# Patient Record
Sex: FEMALE | Race: OTHER | URBAN - METROPOLITAN AREA
[De-identification: names, ages, dates, MRNs, and addresses within clinical notes are randomized per-mention and may not be internally consistent; named-entity substitution may affect disease eponyms.]

---

## 2024-04-05 ENCOUNTER — OFFICE VISIT (OUTPATIENT)
Dept: POSTPARTUM | Facility: CLINIC | Age: 39
End: 2024-04-05

## 2024-04-05 DIAGNOSIS — R52 PAIN AGGRAVATED BY BREAST FEEDING: ICD-10-CM

## 2024-04-05 DIAGNOSIS — O92.79 PAIN AGGRAVATED BY BREAST FEEDING: ICD-10-CM

## 2024-04-05 PROCEDURE — 99214 OFFICE O/P EST MOD 30 MIN: CPT | Performed by: PEDIATRICS

## 2024-04-05 NOTE — PROGRESS NOTES
"  INITIAL BREAST FEEDING EVALUATION    Informant/Relationship: Erum    Discussion of General Lactation Issues: Feedings have been painful since the beginning.  Erum's nipples were damaged early. Erum stopped breastfeeding for a bit due to her pain but started again yesterday.  She is feeding with a nipple shield.   The shields were provided by the hospital that Erum delivered at (in [place]).  She did not receive support from a lactation professional while in the hospital.  Erum has been experiencing \"pins and needles\" pain in her arms/hands and occasionally her legs while she feeds.    Infant is 6 days old today.        History:  Fertility Problem:no  Breast changes:yes - breasts were robison and tender, areolas were larger and darker.  Developed pain in her nipples at 16 weeks that was relieved with warm compresses.  This resolved spontaneously over time  : yes - after a prolonged labor.  Labored at home without meds for 3 days prior to going the hospital.  Labor was augmented with pitocin and Erum had an epidural  Full term:yes - 39 6/7 weeks   labor:no  First nursing/attempt < 1 hour after birth:yes - baby latched briefly in the delivery room during STS  Skin to skin following delivery:yes - in the delivery room after baby was assessed due to nuchal cord  Breast changes after delivery:yes - breasts are robison.  Milk was in by day 3  Rooming in (infant in room with mother with exception of procedures, eg. Circumcision: yes  Blood sugar issues:no  NICU stay:no  Jaundice:mild  Phototherapy:no  Supplement given: (list supplement and method used as well as reason(s):no    No past medical history on file.    No current outpatient medications on file.    Not on File    Social History     Substance and Sexual Activity   Drug Use Not on file       Social History Never a smoker    Interval Breastfeeding History:  Frequency of breast feeding: Every 2-3 hours.  Sometimes she needs " to be woken up for feedings.  Does mother feel breastfeeding is effective: If no, explain: Erum is not sure  Does infant appear satisfied after nursing:Yes, but only since she started using a nipple shield  Stooling pattern normal: No.  She has not passed a stool in more than 24 hours  Urinating frequently:Yes  Using shield or shells: Yes     Alternative/Artificial Feedings:   Bottle: Yes, but no in the last 24 hours.  Had been exclusively bottle feeding for a day or so.  Using an Sindi bottle. Not using paced bottle feeding technique.  Cup: No  Syringe/Finger: No           Formula Type: Enfamil NeuroPro                     Amount: none in the last 24 hours. Prior to that she was fed 1 ounce 6 times over 24 hours            Breast Milk:                      Amount: 30 ml when available            Frequency Q 2-3 Hr between feedings  Elimination Problems: Yes, not stooling      Equipment:  Nipple Shield             Type: Medela contact             Size: 24mm and 20mm             Frequency of Use: currently for every feeding.  Pump            Type: Has a wearable pump and a manual pump.  Does not know the brand of either            Frequency of Use: since resuming breastfeeding, not much at all.  Did not pump or nurse at all for about 24 hours when breastfeeding became too painful. For a short time was pumping after every feeding.  Shells            Type: none            Frequency of use: n/a    Equipment Problems: yes pumping is painful    Mom:  Breast: Large pendulous breasts.  Closely spaced.  Some palpable fullness  Nipple Assessment in General: small everted nipples bilaterally  Mother's Awareness of Feeding Cues                 Recognizes: Yes                  Verbalizes: Yes  Support System: FOB, extended family  History of Breastfeeding: none  Changes/Stressors/Violence: Erum is concerned that breastfeeding is painful, that Mandi is not getting adequate milk when nursing and that her milk production  is not sufficient.  Concerns/Goals: Erum desires to breastfeed during her maternity leave and then provide all the milk that Mandi needs for at least a year.    Problems with Mom: attachment associated pain    Physical Exam  Constitutional:       Appearance: Normal appearance.   Pulmonary:      Effort: Pulmonary effort is normal.   Musculoskeletal:         General: Normal range of motion.      Cervical back: Normal range of motion and neck supple.   Neurological:      Mental Status: She is alert and oriented to person, place, and time.   Skin:     General: Skin is warm and dry.   Psychiatric:         Mood and Affect: Mood normal.         Behavior: Behavior normal.         Thought Content: Thought content normal.         Judgment: Judgment normal.         Infant:  Behaviors: Alert  Color: normal  Birth weight: 2925 grams  Current weight: 2760 grams     Problems with infant: slow weight gain, shallow latch      General Appearance:  Alert, active, no distress                            Head:  Normocephalic, AFOF, sutures opposed                            Eyes:   Conjunctiva clear, no drainage                            Ears:   Normally placed, no anomolies                           Nose:   No drainage or erythema                          Mouth:  No lesions.  Wide gape.  Tongue extends to the lower lip, the tip of the tongue lateralizes slightly but the tongue remains flat with only the very edges of the tongue curling up with crying. Good cupping and peristalsis felt as she sucked but she recruited her lips and gums to keep my finger in her mouth.  The lingual frenulum is short, attached midway between the tip and the base of the tongue with a webbed attachment at the base of the lower alveolar ridge.                   Neck:  Supple, symmetrical, trachea midline                Respiratory:  No grunting, flaring, retractions, breath sounds clear and equal           Cardiovascular:  Regular rate and rhythm. No  murmur. Adequate perfusion/capillary refill. Femoral pulse present                  Abdomen:    Soft, non-tender, no masses, bowel sounds present, no HSM            Genitourinary:  Normal female genitalia, anus patent                         Spine:   No abnormalities noted       Musculoskeletal:   Full range of motion         Skin/Hair/Nails:   Skin warm, dry, and intact, no rashes or abnormal dyspigmentation or lesions               Neurologic:   No abnormal movement, tone appropriate for gestational age    [place] Latch:  Efficiency:               Lips Flanged: Yes              Depth of latch: deep initially but slipped back into a shallow latch as she fed.              Audible Swallow: Yes, sustained SSB              Visible Milk: Yes              Wide Open/ Asymmetrical: Yes, to start              Suck Swallow Cycle: Breathing: unlabored, Coordinated: yes  Nipple Assessment after latch: Normal: elongated/eraser, no discoloration and no damage noted.  Latch Problems: Erum needed some help with positioning.  After education, and with support from me, she was able to guide Mandi to a deep asymmetrical latch without a nipple shield.  She experienced more pain when feeding on the left breast.  She rated the pain 4/10 during most of the feeding.  The pain intensified as Mandi slipped into a shallow latch.  Latch on the right breast was initially completely comfortable but pain resumed as again Mandi slipped into a shallow latch.  Mandi was able to feed on both breasts with a good amount of active SSB observed and she was completely content after feeding.    Position:  Infant's Ergonomics/Body               Body Alignment: Yes               Head Supported: Yes               Close to Mom's body/ Lifted/ Supported: Yes, after education.               Mom's Ergonomics/Body: Yes, after education                           Supported: Yes                           Sitting Back: Yes, after education                            Brings Baby to her breast: Yes, after education  Positioning Problems: Initially, Erum positioned Mandi in cross cradle hold.  She leaned forward and twisted her torso to bring her nipple to Mandi's mouth.  There was a visible space between their bodies.  She shaped her breast opposite the direction of Mandi's mouth and positioned her nipple at Mandi's lower lip.      Handouts:   Paced bottle feeding, Hands on pumping, Hand expression, and Latch Check List    Education:  Reviewed Latch: Demonstrated how to gently compress the breast and align the baby so that her nose is just above the nipple with her lower lip and chin touching the breast to encourage the deepest, widest, off-center latch.   Reviewed Positioning for Dyad: Demonstrated how to position mom comfortably and supported with her baby belly to belly prior to bringing her baby to her breast  Reviewed Frequency/Supply & Demand: Discussed how milk production is established and maintained  Reviewed Infant:Cues and varied States of Awareness  Reviewed Infant Elimination: discussed expected stool and urine output at this age  Reviewed Alternative/Artificial Feedings: Discussed and demonstrated paced bottle feeding.  Reviewed Mom/Breast care: Discussed appropriate handling of the breasts to avoid inflammation, pain and injury  Reviewed Equipment: discussed how to use the cycles of the pump to imitate how baby nurses.  Discussed how to determine what size flange to use.      Plan:    Reassurance and support given.  I acknowledged Erum's pain and her commitment to breastfeeding.  I encouraged her to continue to offer the breast as often as she feels comfortable. We worked on positioning to improve her comfort and confidence.  I encouraged her to feed without the nipple shield.  I reviewed the risks involved with nipple shield use and the fact that the shield she is using is too large for her.  I recommended she pump if a feeding at the breast  is missed, if Mandi does not feed effectively at the breast and if milk is needed for supplementation.  She was given information about effective pumping technique and I recommended a smaller flange to improve fit.  I encouraged her to handle her breasts gently to prevent injury, pain and inflammation.  A follow up appointment was scheduled to monitor progress.  Will consider a referral to breastfeeding medicine if her pain does not completely resolve.  Potential sources of her pain include vasospasm or neuropathic that also occurred during her pregnancy or ongoing nipple trauma related to latch and/or suckling issues.  I encouraged Erum to call with any questions or concerns.    I have spent 90 minutes with Patient and family today in which greater than 50% of this time was spent in counseling/coordination of care regarding Patient and family education.

## 2024-04-05 NOTE — PATIENT INSTRUCTIONS
"Continue to offer the breast as often as you feel comfortable.  Gently compress the breast and align the baby so that her nose is just above the nipple with her lower lip and chin touching the breast to encourage the deepest, widest, off-center latch.   Pump if a feeding at the breast is missed, if Mandi does not latch or feed effectively or if milk is needed for supplementation.  When pumping, cycle your pump through stimulation and expression mode several times in a session to stimulate several let downs until you have expressed enough milk to feed the baby and to achieve breast comfort.  There is no need to \"empty\" the breast completely. Use gentle hands on pumping and hand expression   Maintain your pump as recommended. Use flange that fits comfortably and allows the breast to empty effectively.    Follow up as scheduled.  Please call with any questions or concerns.  "

## 2024-04-06 NOTE — PROGRESS NOTES
I have reviewed the notes, assessments, and/or procedures performed by Staci Rogers RN, IBCLC, I concur with her/his documentation of Eurm Albrecht MD 04/06/24

## 2024-04-12 ENCOUNTER — OFFICE VISIT (OUTPATIENT)
Dept: POSTPARTUM | Facility: CLINIC | Age: 39
End: 2024-04-12

## 2024-04-12 DIAGNOSIS — O92.79 PAIN AGGRAVATED BY BREAST FEEDING: ICD-10-CM

## 2024-04-12 DIAGNOSIS — R52 PAIN AGGRAVATED BY BREAST FEEDING: ICD-10-CM

## 2024-04-12 NOTE — PROGRESS NOTES
BREAST FEEDING FOLLOW UP VISIT    Informant/Relationship: Erum    Discussion of General Lactation Issues: Attachment is still painful but brief with complete resolution.  Mandi still slips back into a shallow latch as she feeds.  The pins and needles that Erum was experiencing during feedings has resolved.    Infant is 13 days old today.    Interval Breastfeeding History:  Frequency of breast feeding: Every 1-2.5 hours. Feeds hourly about 6 times a day and the rest of the time she feeds about every 2 hours.  She is only offering one breast per feeding typically  Does mother feel breastfeeding is effective: Yes, but attachment is still painful  Does infant appear satisfied after nursing:Yes, but sometimes only for an hour   Stooling pattern normal: Yes  Urinating frequently:Yes  Using shield or shells: Not currently     Alternative/Artificial Feedings:   Bottle: Yes, but only occasionally.  Using an Sindi bottle. Not using paced bottle feeding technique.  Cup: No  Syringe/Finger: No           Formula Type: Enfamil NeuroPro                     Amount: none recently            Breast Milk:                      Amount: 30 ml             Frequency Q 1-2.5Hr between feedings  Elimination Problems:No        Equipment:  Nipple Shield             Type: Medela contact             Size: 24mm and 20mm             Frequency of Use: none  Pump            Type: Has a wearable pump and a manual pump.  Does not know the brand of either            Frequency of Use: occasionally  Shells            Type: none            Frequency of use: n/a     Equipment Problems: No     Mom:  Breast: Large pendulous breasts.  Closely spaced.   Nipple Assessment in General: small everted nipples bilaterally  Mother's Awareness of Feeding Cues                 Recognizes: Yes                  Verbalizes: Yes  Support System: FOB, extended family  History of Breastfeeding: none  Changes/Stressors/Violence: Erum is concerned that  breastfeeding is painful although not as painful as it was in the beginning.    Concerns/Goals: Erum desires to breastfeed during her maternity leave and then provide all the milk that Mandi needs for at least a year.     Problems with Mom: attachment associated pain    Physical Exam  Constitutional:       Appearance: Normal appearance.   HENT:      Head: Normocephalic and atraumatic.   Pulmonary:      Effort: Pulmonary effort is normal.   Musculoskeletal:         General: Normal range of motion.      Cervical back: Normal range of motion and neck supple.   Neurological:      Mental Status: She is alert and oriented to person, place, and time.   Skin:     General: Skin is warm and dry.   Psychiatric:         Mood and Affect: Mood normal.         Behavior: Behavior normal.         Thought Content: Thought content normal.         Judgment: Judgment normal.         Infant:  Behaviors: Alert  Color: Pink  Birth weight: 2925 grams  Current weight: 2990 grams    Problems with infant: shallow attachment    General Appearance:  Alert, active, no distress                            Head:  Normocephalic, AFOF, sutures opposed                            Eyes:   Conjunctiva clear, no drainage                            Ears:   Normally placed, no anomolies                           Nose:   No drainage or erythema                          Mouth:  No lesions.  Wide gape.  Tongue extends to the lower lip, the tip of the tongue lateralizes slightly but the tongue remains flat with only the very edges of the tongue curling up with crying. Good cupping and peristalsis felt as she sucked but she recruited her lips and gums to keep my finger in her mouth.  The lingual frenulum is short, attached midway between the tip and the base of the tongue with a webbed attachment at the base of the lower alveolar ridge.                            Neck:  Supple, symmetrical, trachea midline                Respiratory:  No grunting, flaring,  "retractions, breath sounds clear and equal           Cardiovascular:  Regular rate and rhythm. No murmur. Adequate perfusion/capillary refill. Femoral pulse present                  Abdomen:    Soft, non-tender, no masses, bowel sounds present, no HSM            Genitourinary:  Normal female genitalia, anus patent                         Spine:   No abnormalities noted       Musculoskeletal:   Full range of motion         Skin/Hair/Nails:   Skin warm, dry, and intact, no rashes or abnormal dyspigmentation or lesions               Neurologic:   No abnormal movement, tone appropriate for gestational age     Latch:  Efficiency:               Lips Flanged: Yes              Depth of latch: good, not optimal. Some dimpling of the cheeks as she sucked              Audible Swallow: Yes, sustained SSB              Visible Milk: Yes              Wide Open/ Asymmetrical: not optimally wide              Suck Swallow Cycle: Breathing: unlabored, Coordinated: yes  Nipple Assessment after latch: Normal: elongated/eraser, no discoloration and no damage noted.  Latch Problems: Erum had latch on pain and then \"pinching\" pain near the dependent corner of Mandi's mouth that she rated a 4/10 for a few minutes and then that pain resolved as well.  Mandi fed effectively and several sustained suckling bursts were observed    Position:  Infant's Ergonomics/Body               Body Alignment: Yes               Head Supported: Yes               Close to Mom's body/ Lifted/ Supported: Yes               Mom's Ergonomics/Body: Yes                           Supported: Yes                           Sitting Back: Yes                           Brings Baby to her breast: Yes  Positioning Problems: None.  Erum positioned Mandi effectively in cross cradle hold with a pillow for support.      Handouts:   None    Education:  Reviewed Latch: discussed that Mandi continues to use her gums to maintain \"latch\" and this explains " Erum's pain        Plan:    Reassurance and support given.  I acknowledged Erum's pain and her commitment to breastfeeding.  I encouraged her to continue to feed Mandi on demand and to pump if a feeding at the breast is missed.  I recommended that she schedule an appointment with breastfeeding medicine if her attachment associated pain does not resolve as Mandi receives treatment from PT and ST  I encouraged her to call with any questions or concerns.    I have spent 60 minutes with Patient and family today in which greater than 50% of this time was spent in counseling/coordination of care regarding Instructions for management, Patient and family education, and Counseling / Coordination of care.

## 2024-04-12 NOTE — PATIENT INSTRUCTIONS
Continue to feed Mandi on demand.  Pump if a feeding at the breast is missed and as needed to obtain milk for bottle feeding.  Please call for a follow up appointment with our physician if your pain does not completely resolve as Mandi receives treatment from PT and ST.  You can also follow up with me as desired for other feeding issues.  Please call with any questions or concerns.

## 2024-04-13 NOTE — PROGRESS NOTES
I have reviewed the notes, assessments, and/or procedures performed by Staci Rogers RN, IBCLC, I concur with her/his documentation of Erum Albrecht MD 04/13/24